# Patient Record
Sex: FEMALE | Race: OTHER | Employment: FULL TIME | URBAN - METROPOLITAN AREA
[De-identification: names, ages, dates, MRNs, and addresses within clinical notes are randomized per-mention and may not be internally consistent; named-entity substitution may affect disease eponyms.]

---

## 2018-11-24 ENCOUNTER — HOSPITAL ENCOUNTER (EMERGENCY)
Facility: HOSPITAL | Age: 38
Discharge: HOME | End: 2018-11-24
Attending: EMERGENCY MEDICINE

## 2018-11-24 VITALS
HEIGHT: 66 IN | TEMPERATURE: 97.9 F | BODY MASS INDEX: 20.89 KG/M2 | DIASTOLIC BLOOD PRESSURE: 59 MMHG | HEART RATE: 74 BPM | OXYGEN SATURATION: 100 % | RESPIRATION RATE: 18 BRPM | SYSTOLIC BLOOD PRESSURE: 127 MMHG | WEIGHT: 130 LBS

## 2018-11-24 DIAGNOSIS — M20.12 HALLUX VALGUS, ACQUIRED, BILATERAL: Primary | ICD-10-CM

## 2018-11-24 DIAGNOSIS — M20.10 HALLUX VALGUS WITH BUNIONS, UNSPECIFIED LATERALITY: ICD-10-CM

## 2018-11-24 DIAGNOSIS — M20.11 HALLUX VALGUS, ACQUIRED, BILATERAL: Primary | ICD-10-CM

## 2018-11-24 DIAGNOSIS — M21.619 HALLUX VALGUS WITH BUNIONS, UNSPECIFIED LATERALITY: ICD-10-CM

## 2018-11-24 PROCEDURE — 99283 EMERGENCY DEPT VISIT LOW MDM: CPT

## 2018-11-24 NOTE — DISCHARGE INSTRUCTIONS
Bunion   WHAT YOU NEED TO KNOW:   A bunion is a bony lump at the base of your big toe  As it grows, it sticks out from the side of your foot and may move your toe out of place  DISCHARGE INSTRUCTIONS:   Return to the emergency department if:   · You have severe pain in your toe  · You cannot put weight on your foot  Contact your healthcare provider if:   · You cannot do your daily activities because of the pain  · You have questions or concerns about your condition or care  Medicines: You may need any of the following:  · Acetaminophen  decreases pain  It is available without a doctor's order  Ask how much to take and how often to take it  Follow directions  Acetaminophen can cause liver damage if not taken correctly  · NSAIDs , such as ibuprofen, help decrease swelling, pain, and fever  This medicine is available with or without a doctor's order  NSAIDs can cause stomach bleeding or kidney problems in certain people  If you take blood thinner medicine, always ask your healthcare provider if NSAIDs are safe for you  Always read the medicine label and follow directions  · Take your medicine as directed  Contact your healthcare provider if you think your medicine is not helping or if you have side effects  Tell him of her if you are allergic to any medicine  Keep a list of the medicines, vitamins, and herbs you take  Include the amounts, and when and why you take them  Bring the list or the pill bottles to follow-up visits  Carry your medicine list with you in case of an emergency  Self-care:   · Use a bunion pad  Wear a thick, ring-shaped pad around and over the bunion to cushion it  · Wear shoes that fit well  Wear wide, low-heeled shoes that have plenty of room for your toes  Do not wear tight shoes or heels that are higher than 2 inches  · Wear shoe inserts or arch supports  These will decrease pressure on the bunion  · Separate your big toe at night    Separate the big toe from the others with a foam pad while you sleep  Use a light elastic bandage to keep the pad in place  · Stretch your foot each day  This will help decrease pressure and increase foot strength  Ask what foot exercises are best for you  · Apply ice  on your toe for 15 to 20 minutes every hour or as directed  Use an ice pack or put crushed ice in a plastic bag  Cover it with a towel  Ice helps prevent tissue damage and decreases swelling and pain  · Go to physical therapy if directed  A physical therapist teaches you exercises to help improve movement and strength, and to decrease pain  Follow up with your healthcare provider as directed: You may be referred to a podiatrist (foot specialist)  Write down your questions so you remember to ask them during your visits  © 2017 [facility] Information is for End User's use only and may not be sold, redistributed or otherwise used for commercial purposes  All illustrations and images included in CareNotes® are the copyrighted property of A D A M , Inc  or Nathan Cormier  The above information is an  only  It is not intended as medical advice for individual conditions or treatments  Talk to your doctor, nurse or pharmacist before following any medical regimen to see if it is safe and effective for you

## 2018-11-24 NOTE — ED PROVIDER NOTES
History  Chief Complaint   Patient presents with    Foot Pain     Pt has c/o L foot pain for 2 months     33 y/o female in nad, with subacute pain in both feet left > right, pt works for [employer], and on her feet  She states she changed her shoes to lessen the pain in her foot but lately increased pain mentioned  She denies fall or trauma  She has a prominent navicular bone anatomy with hallux valgus  Urged her for podiatry follow up  None       History reviewed  No pertinent past medical history  History reviewed  No pertinent surgical history  History reviewed  No pertinent family history  I have reviewed and agree with the history as documented  Social History   Substance Use Topics    Smoking status: Never Smoker    Smokeless tobacco: Never Used    Alcohol use No        Review of Systems   Musculoskeletal: Positive for arthralgias  All other systems reviewed and are negative  Physical Exam  Physical Exam   Constitutional: She appears well-developed and well-nourished  HENT:   Head: Normocephalic and atraumatic  Eyes: Pupils are equal, round, and reactive to light  Neck: Normal range of motion  Neck supple  Pulmonary/Chest: Effort normal    Abdominal: Soft  There is no guarding  Musculoskeletal: Normal range of motion  She exhibits tenderness  Left foot: There is tenderness, bony tenderness and swelling  There is normal range of motion, normal capillary refill, no crepitus, no deformity and no laceration  Feet:    Prominent navicular bone with bunion noted  Skin: Skin is warm  Psychiatric: She has a normal mood and affect  Thought content normal    Nursing note and vitals reviewed        Vital Signs  ED Triage Vitals [11/24/18 1157]   Temperature Pulse Respirations Blood Pressure SpO2   97 9 °F (36 6 °C) 74 18 127/59 100 %      Temp Source Heart Rate Source Patient Position - Orthostatic VS BP Location FiO2 (%)   Oral Monitor Sitting Left arm -- Pain Score       2           Vitals:    11/24/18 1157   BP: 127/59   Pulse: 74   Patient Position - Orthostatic VS: Sitting       Visual Acuity      ED Medications  Medications - No data to display    Diagnostic Studies  Results Reviewed     None                 No orders to display              Procedures  Procedures       Phone Contacts  ED Phone Contact    ED Course                               MDM  Number of Diagnoses or Management Options  Hallux valgus with bunions, unspecified laterality: new and does not require workup  Hallux valgus, acquired, bilateral: new and does not require workup  Diagnosis management comments: 35 y/o female in nad, acute on chronic b/l foot worse in left foot complaint  Pt with hallux valgus with prominent navicular bone in both feet noted  Pt agreed for podiatrist follow up  rteri given  Amount and/or Complexity of Data Reviewed  Tests in the radiology section of CPT®: ordered and reviewed      CritCare Time    Disposition  Final diagnoses:   Hallux valgus, acquired, bilateral   Hallux valgus with bunions, unspecified laterality     Time reflects when diagnosis was documented in both MDM as applicable and the Disposition within this note     Time User Action Codes Description Comment    11/24/2018 12:57 PM Ted Gross Add [M20 11,  M20 12] Hallux valgus, acquired, bilateral     11/24/2018 12:58 PM Ted Gross Add [M20 10,  M21 619] Hallux valgus with bunions, unspecified laterality       ED Disposition     ED Disposition Condition Comment    Discharge  Grant Kelly discharge to home/self care  Condition at discharge: Stable        Follow-up Information     Follow up With Specialties Details Why Contact Info    Marge Almaraz, [address] [place] [address]  [phone]      Angi Poon DPM Podiatry In 3 days podiatrist [address] [facility] [address]      MALINDA Otero [facility]  podiatrist [address] [facility] [address] [place] [address]  [phone]            Patient's Medications    No medications on file     No discharge procedures on file      ED Provider  Electronically Signed by           Shadia Lam PA-C  11/24/18 5763

## 2020-05-25 ENCOUNTER — HOSPITAL ENCOUNTER (EMERGENCY)
Facility: HOSPITAL | Age: 40
Discharge: HOME | End: 2020-05-25
Attending: EMERGENCY MEDICINE | Admitting: EMERGENCY MEDICINE
Payer: COMMERCIAL

## 2020-05-25 VITALS
BODY MASS INDEX: 22.5 KG/M2 | SYSTOLIC BLOOD PRESSURE: 127 MMHG | DIASTOLIC BLOOD PRESSURE: 74 MMHG | HEIGHT: 66 IN | RESPIRATION RATE: 20 BRPM | WEIGHT: 140 LBS | OXYGEN SATURATION: 100 % | HEART RATE: 75 BPM | TEMPERATURE: 98.3 F

## 2020-05-25 DIAGNOSIS — R42 LIGHTHEADEDNESS: ICD-10-CM

## 2020-05-25 DIAGNOSIS — U07.1 COVID-19: Primary | ICD-10-CM

## 2020-05-25 DIAGNOSIS — R53.1 GENERALIZED WEAKNESS: ICD-10-CM

## 2020-05-25 LAB
ANION GAP SERPL CALCULATED.3IONS-SCNC: 7 MMOL/L (ref 4–13)
ATRIAL RATE: 75 BPM
BUN SERPL-MCNC: 10 MG/DL (ref 5–25)
CALCIUM SERPL-MCNC: 8.4 MG/DL (ref 8.3–10.1)
CHLORIDE SERPL-SCNC: 104 MMOL/L (ref 100–108)
CO2 SERPL-SCNC: 27 MMOL/L (ref 21–32)
CREAT SERPL-MCNC: 0.56 MG/DL (ref 0.6–1.3)
EXT PREG TEST URINE: NEGATIVE
EXT. CONTROL ED NAV: NORMAL
FLUAV RNA NPH QL NAA+PROBE: NORMAL
FLUBV RNA NPH QL NAA+PROBE: NORMAL
GFR SERPL CREATININE-BSD FRML MDRD: 116 ML/MIN/1.73SQ M
GLUCOSE SERPL-MCNC: 101 MG/DL (ref 65–140)
P AXIS: 41 DEGREES
POTASSIUM SERPL-SCNC: 3.4 MMOL/L (ref 3.5–5.3)
PR INTERVAL: 138 MS
QRS AXIS: 260 DEGREES
QRSD INTERVAL: 94 MS
QT INTERVAL: 394 MS
QTC INTERVAL: 439 MS
RSV RNA NPH QL NAA+PROBE: NORMAL
SARS-COV-2 RNA RESP QL NAA+PROBE: POSITIVE
SODIUM SERPL-SCNC: 138 MMOL/L (ref 136–145)
T WAVE AXIS: 42 DEGREES
VENTRICULAR RATE: 75 BPM

## 2020-05-25 PROCEDURE — 99284 EMERGENCY DEPT VISIT MOD MDM: CPT | Performed by: EMERGENCY MEDICINE

## 2020-05-25 PROCEDURE — 96360 HYDRATION IV INFUSION INIT: CPT

## 2020-05-25 PROCEDURE — U0003 INFECTIOUS AGENT DETECTION BY NUCLEIC ACID (DNA OR RNA); SEVERE ACUTE RESPIRATORY SYNDROME CORONAVIRUS 2 (SARS-COV-2) (CORONAVIRUS DISEASE [COVID-19]), AMPLIFIED PROBE TECHNIQUE, MAKING USE OF HIGH THROUGHPUT TECHNOLOGIES AS DESCRIBED BY CMS-2020-01-R: HCPCS | Performed by: EMERGENCY MEDICINE

## 2020-05-25 PROCEDURE — 81025 URINE PREGNANCY TEST: CPT | Performed by: EMERGENCY MEDICINE

## 2020-05-25 PROCEDURE — 36415 COLL VENOUS BLD VENIPUNCTURE: CPT | Performed by: EMERGENCY MEDICINE

## 2020-05-25 PROCEDURE — 99285 EMERGENCY DEPT VISIT HI MDM: CPT

## 2020-05-25 PROCEDURE — 93010 ELECTROCARDIOGRAM REPORT: CPT | Performed by: INTERNAL MEDICINE

## 2020-05-25 PROCEDURE — 80048 BASIC METABOLIC PNL TOTAL CA: CPT | Performed by: EMERGENCY MEDICINE

## 2020-05-25 PROCEDURE — 93005 ELECTROCARDIOGRAM TRACING: CPT

## 2020-05-25 PROCEDURE — 87631 RESP VIRUS 3-5 TARGETS: CPT | Performed by: EMERGENCY MEDICINE

## 2020-05-25 RX ORDER — KETAMINE HCL IN NACL, ISO-OSM 100MG/10ML
2 SYRINGE (ML) INJECTION ONCE
Status: DISCONTINUED | OUTPATIENT
Start: 2020-05-25 | End: 2020-05-25

## 2020-05-25 RX ORDER — OSELTAMIVIR PHOSPHATE 30 MG/1
30 CAPSULE ORAL EVERY 12 HOURS SCHEDULED
COMMUNITY

## 2020-05-25 RX ORDER — LIDOCAINE HYDROCHLORIDE AND EPINEPHRINE 10; 10 MG/ML; UG/ML
5 INJECTION, SOLUTION INFILTRATION; PERINEURAL ONCE
Status: DISCONTINUED | OUTPATIENT
Start: 2020-05-25 | End: 2020-05-25

## 2020-05-25 RX ADMIN — SODIUM CHLORIDE 1000 ML: 0.9 INJECTION, SOLUTION INTRAVENOUS at 15:22

## 2024-06-02 ENCOUNTER — HOSPITAL ENCOUNTER (EMERGENCY)
Facility: HOSPITAL | Age: 44
Discharge: HOME | End: 2024-06-02
Attending: EMERGENCY MEDICINE | Admitting: EMERGENCY MEDICINE
Payer: COMMERCIAL

## 2024-06-02 VITALS
HEART RATE: 86 BPM | OXYGEN SATURATION: 100 % | SYSTOLIC BLOOD PRESSURE: 113 MMHG | DIASTOLIC BLOOD PRESSURE: 56 MMHG | RESPIRATION RATE: 16 BRPM | TEMPERATURE: 97.9 F

## 2024-06-02 DIAGNOSIS — M54.50 ACUTE RIGHT-SIDED LOW BACK PAIN WITHOUT SCIATICA: Primary | ICD-10-CM

## 2024-06-02 LAB
BACTERIA UR QL AUTO: NORMAL /HPF
BILIRUB UR QL STRIP: NEGATIVE
CLARITY UR: CLEAR
COLOR UR: YELLOW
EXT PREGNANCY TEST URINE: NEGATIVE
EXT. CONTROL: NORMAL
GLUCOSE UR STRIP-MCNC: NEGATIVE MG/DL
HGB UR QL STRIP.AUTO: ABNORMAL
KETONES UR STRIP-MCNC: NEGATIVE MG/DL
LEUKOCYTE ESTERASE UR QL STRIP: ABNORMAL
NITRITE UR QL STRIP: NEGATIVE
NON-SQ EPI CELLS URNS QL MICRO: NORMAL /HPF
PH UR STRIP.AUTO: 5.5 [PH] (ref 4.5–8)
PROT UR STRIP-MCNC: NEGATIVE MG/DL
RBC #/AREA URNS AUTO: NORMAL /HPF
SP GR UR STRIP.AUTO: 1.02 (ref 1–1.03)
UROBILINOGEN UR QL STRIP.AUTO: 0.2 E.U./DL
WBC #/AREA URNS AUTO: NORMAL /HPF

## 2024-06-02 PROCEDURE — 99284 EMERGENCY DEPT VISIT MOD MDM: CPT

## 2024-06-02 PROCEDURE — 81025 URINE PREGNANCY TEST: CPT

## 2024-06-02 PROCEDURE — 99284 EMERGENCY DEPT VISIT MOD MDM: CPT | Performed by: EMERGENCY MEDICINE

## 2024-06-02 PROCEDURE — 81001 URINALYSIS AUTO W/SCOPE: CPT

## 2024-06-02 PROCEDURE — 96374 THER/PROPH/DIAG INJ IV PUSH: CPT

## 2024-06-02 RX ORDER — METHOCARBAMOL 500 MG/1
500 TABLET, FILM COATED ORAL 2 TIMES DAILY
Qty: 20 TABLET | Refills: 0 | Status: SHIPPED | OUTPATIENT
Start: 2024-06-02

## 2024-06-02 RX ORDER — KETOROLAC TROMETHAMINE 30 MG/ML
15 INJECTION, SOLUTION INTRAMUSCULAR; INTRAVENOUS ONCE
Status: COMPLETED | OUTPATIENT
Start: 2024-06-02 | End: 2024-06-02

## 2024-06-02 RX ORDER — LIDOCAINE 50 MG/G
1 PATCH TOPICAL ONCE
Status: DISCONTINUED | OUTPATIENT
Start: 2024-06-02 | End: 2024-06-02 | Stop reason: HOSPADM

## 2024-06-02 RX ORDER — ACETAMINOPHEN 325 MG/1
650 TABLET ORAL ONCE
Status: COMPLETED | OUTPATIENT
Start: 2024-06-02 | End: 2024-06-02

## 2024-06-02 RX ORDER — LIDOCAINE 50 MG/G
1 PATCH TOPICAL DAILY
Qty: 14 PATCH | Refills: 0 | Status: SHIPPED | OUTPATIENT
Start: 2024-06-02

## 2024-06-02 RX ADMIN — KETOROLAC TROMETHAMINE 15 MG: 30 INJECTION, SOLUTION INTRAMUSCULAR; INTRAVENOUS at 09:23

## 2024-06-02 RX ADMIN — ACETAMINOPHEN 650 MG: 325 TABLET, FILM COATED ORAL at 09:01

## 2024-06-02 RX ADMIN — LIDOCAINE 5% 1 PATCH: 700 PATCH TOPICAL at 09:02

## 2024-06-02 NOTE — ED PROVIDER NOTES
"History  Chief Complaint   Patient presents with    Flank Pain     Right side for the past 3-4 days when moving pain becomes \"sharp\".  Pt denies any urgency or burning with urination.      43-year-old female with no significant past medical history who presents for evaluation of right-sided lower back pain for the past 3 to 4 days.  The patient states that she is experiencing sharp pain in her right lower back with movement.  The patient reports minimal pain at rest.  The patient denies radiation of her pain.  Patient denies any falls or trauma.  The the patient took 1 dose of 200 mg of ibuprofen without improvement.  The patient has not taken any other medications for symptoms.  The patient denies fever, chills, nausea, vomiting, abdominal pain, diarrhea, dysuria, hematuria, vaginal bleeding, vaginal discharge, numbness or weakness in her lower extremities, saddle anesthesia, incontinence, urinary retention, and history of IV drug abuse.        Prior to Admission Medications   Prescriptions Last Dose Informant Patient Reported? Taking?   oseltamivir (TAMIFLU) 30 MG capsule   Yes No   Sig: Take 30 mg by mouth every 12 (twelve) hours      Facility-Administered Medications: None       No past medical history on file.    Past Surgical History:   Procedure Laterality Date    US GUIDED THYROID BIOPSY  12/9/2020       No family history on file.  I have reviewed and agree with the history as documented.    E-Cigarette/Vaping    E-Cigarette Use Never User      E-Cigarette/Vaping Substances    Nicotine No     THC No     CBD No     Flavoring No     Other No     Unknown No      Social History     Tobacco Use    Smoking status: Never    Smokeless tobacco: Never   Vaping Use    Vaping status: Never Used   Substance Use Topics    Alcohol use: No    Drug use: No        Review of Systems   Constitutional:  Negative for chills, diaphoresis and fever.   HENT:  Negative for congestion and sore throat.    Eyes:  Negative for pain and " redness.   Respiratory:  Negative for cough and shortness of breath.    Cardiovascular:  Negative for chest pain, palpitations and leg swelling.   Gastrointestinal:  Negative for abdominal pain, diarrhea, nausea and vomiting.   Genitourinary:  Negative for dysuria, flank pain and hematuria.   Musculoskeletal:  Positive for back pain. Negative for neck pain.   Skin:  Negative for color change, pallor and rash.   Neurological:  Negative for dizziness, syncope, weakness, light-headedness, numbness and headaches.   All other systems reviewed and are negative.      Physical Exam  ED Triage Vitals [06/02/24 0750]   Temperature Pulse Respirations Blood Pressure SpO2   97.9 °F (36.6 °C) 86 16 113/56 100 %      Temp Source Heart Rate Source Patient Position - Orthostatic VS BP Location FiO2 (%)   Tympanic Monitor Sitting Left arm --      Pain Score       8             Orthostatic Vital Signs  Vitals:    06/02/24 0750   BP: 113/56   Pulse: 86   Patient Position - Orthostatic VS: Sitting       Physical Exam  Vitals and nursing note reviewed.   Constitutional:       General: She is not in acute distress.     Appearance: Normal appearance. She is not ill-appearing, toxic-appearing or diaphoretic.   HENT:      Head: Normocephalic and atraumatic.      Nose: Nose normal. No congestion or rhinorrhea.      Mouth/Throat:      Mouth: Mucous membranes are moist.      Pharynx: Oropharynx is clear.   Eyes:      General: No scleral icterus.     Extraocular Movements: Extraocular movements intact.      Conjunctiva/sclera: Conjunctivae normal.      Pupils: Pupils are equal, round, and reactive to light.   Cardiovascular:      Rate and Rhythm: Normal rate and regular rhythm.      Pulses: Normal pulses.      Heart sounds: Normal heart sounds. No murmur heard.     No friction rub. No gallop.   Pulmonary:      Effort: Pulmonary effort is normal.      Breath sounds: Normal breath sounds. No wheezing, rhonchi or rales.   Abdominal:      General:  Abdomen is flat.      Palpations: Abdomen is soft.      Tenderness: There is no abdominal tenderness. There is no right CVA tenderness, left CVA tenderness, guarding or rebound.   Musculoskeletal:         General: Tenderness present. No swelling, deformity or signs of injury. Normal range of motion.      Cervical back: Normal range of motion and neck supple. No rigidity or tenderness.      Right lower leg: No edema.      Left lower leg: No edema.      Comments: Mild tenderness of right lumbar region paraspinal muscles.  No midline spinal tenderness.   Lymphadenopathy:      Cervical: No cervical adenopathy.   Skin:     General: Skin is warm and dry.      Capillary Refill: Capillary refill takes less than 2 seconds.      Coloration: Skin is not jaundiced or pale.      Findings: No bruising, erythema, lesion or rash.   Neurological:      General: No focal deficit present.      Mental Status: She is alert and oriented to person, place, and time.      Sensory: No sensory deficit.      Motor: No weakness.      Gait: Gait normal.      Deep Tendon Reflexes: Reflexes normal.         ED Medications  Medications   ketorolac (TORADOL) injection 15 mg (15 mg Intravenous Given 6/2/24 0923)   acetaminophen (TYLENOL) tablet 650 mg (650 mg Oral Given 6/2/24 0901)       Diagnostic Studies  Results Reviewed       Procedure Component Value Units Date/Time    Urine Microscopic [[id number]]  (Normal) Collected: 06/02/24 0915    Lab Status: Final result Specimen: Urine, Clean Catch Updated: 06/02/24 0951     RBC, UA 1-2 /hpf      WBC, UA 1-2 /hpf      Epithelial Cells Occasional /hpf      Bacteria, UA None Seen /hpf     POCT pregnancy, urine [[id number]]  (Normal) Resulted: 06/02/24 0924    Lab Status: Final result Updated: 06/02/24 0924     EXT Preg Test, Ur Negative     Control Valid    Urine Macroscopic, POC [[id number]]  (Abnormal) Collected: 06/02/24 0915    Lab Status: Final result Specimen: Urine Updated: 06/02/24 0917     Color, UA  Yellow     Clarity, UA Clear     pH, UA 5.5     Leukocytes, UA Trace     Nitrite, UA Negative     Protein, UA Negative mg/dl      Glucose, UA Negative mg/dl      Ketones, UA Negative mg/dl      Urobilinogen, UA 0.2 E.U./dl      Bilirubin, UA Negative     Occult Blood, UA Trace     Specific Gravity, UA 1.025    Narrative:      CLINITEK RESULT                   No orders to display         Procedures  Procedures      ED Course                             SBIRT 22yo+      Flowsheet Row Most Recent Value   Initial Alcohol Screen: US AUDIT-C     1. How often do you have a drink containing alcohol? 2 Filed at: 06/02/2024 0752   2. How many drinks containing alcohol do you have on a typical day you are drinking?  0 Filed at: 06/02/2024 0752   3a. Male UNDER 65: How often do you have five or more drinks on one occasion? 0 Filed at: 06/02/2024 0752   3b. FEMALE Any Age, or MALE 65+: How often do you have 4 or more drinks on one occassion? 0 Filed at: 06/02/2024 0752   Audit-C Score 2 Filed at: 06/02/2024 0752   EDITH: How many times in the past year have you...    Used an illegal drug or used a prescription medication for non-medical reasons? Never Filed at: 06/02/2024 0752                  Medical Decision Making  43-year-old female with no significant past medical history who presents for evaluation of right-sided lower back pain for the past 3 to 4 days.  Vitals are within the normal limits.  Exam is significant for right lumbar region paraspinal tenderness but is otherwise unremarkable.  The patient has no red flag symptoms.  Pyelonephritis and nephrolithiasis are unlikely as the patient's pain only occurs with movement.  Will check UA and urine pregnancy.  Will treat symptomatically with Tylenol, Toradol, and lidocaine patch.    The patient reports improvement in her symptoms after treatment.  The patient is instructed to continue taking ibuprofen for her pain.  A prescription for Robaxin and lidocaine patches is sent to  the patient's pharmacy.  A referral to the comprehensive spine program is ordered for follow-up.  Return precautions are given and the patient is discharged.    Amount and/or Complexity of Data Reviewed  Labs: ordered.    Risk  OTC drugs.  Prescription drug management.          Disposition  Final diagnoses:   Acute right-sided low back pain without sciatica     Time reflects when diagnosis was documented in both MDM as applicable and the Disposition within this note       Time User Action Codes Description Comment    6/2/2024 10:22 AM Juan Bass Add [M54.50] Acute right-sided low back pain without sciatica           ED Disposition       ED Disposition   Discharge    Condition   Stable    Date/Time   Sun Jun 2, 2024 1022    Comment   Deepthi Leyva discharge to home/self care.                   Follow-up Information       Follow up With Specialties Details Why Contact Info Additional Information    [facility] Emergency Department Emergency Medicine Go to  If symptoms worsen [address] [place] [id number] [phone] [facility] Emergency Department, [address] [place] [address], [id number]   [phone]    [facility] Comprehensive Spine Program Physical Therapy   [phone]            Discharge Medication List as of 6/2/2024 10:24 AM        START taking these medications    Details   lidocaine (Lidoderm) 5 % Apply 1 patch topically over 12 hours daily Remove & Discard patch within 12 hours or as directed by MD, Starting Sun 6/2/2024, Normal      methocarbamol (ROBAXIN) 500 mg tablet Take 1 tablet (500 mg total) by mouth 2 (two) times a day, Starting Sun 6/2/2024, Normal           CONTINUE these medications which have NOT CHANGED    Details   oseltamivir (TAMIFLU) 30 MG capsule Take 30 mg by mouth every 12 (twelve) hours, Historical Med               PDMP Review       None             ED Provider  Attending physically available  and evaluated Zandavid Leyva. I managed the patient along with the ED Attending.    Electronically Signed by           Juan Bass DO  06/02/24 0363

## 2024-06-02 NOTE — DISCHARGE INSTRUCTIONS
You were seen in the emergency department for right-sided lower back pain.  Your symptoms and exam are consistent with musculoskeletal back pain.  Take over-the-counter medications such as ibuprofen and Tylenol for your pain.  Additionally a prescription for a muscle relaxer and lidocaine patches was sent to your pharmacy.  A referral to the comprehensive spine program was ordered for follow-up.  They will call you to schedule an appointment.  If you have worsening pain or develop any new concerning symptoms please return to the emergency department.

## 2024-06-03 ENCOUNTER — TELEPHONE (OUTPATIENT)
Dept: PHYSICAL THERAPY | Facility: OTHER | Age: 44
End: 2024-06-03

## 2024-06-05 ENCOUNTER — HOSPITAL ENCOUNTER (EMERGENCY)
Facility: HOSPITAL | Age: 44
Discharge: HOME | End: 2024-06-05
Attending: EMERGENCY MEDICINE
Payer: COMMERCIAL

## 2024-06-05 VITALS
RESPIRATION RATE: 18 BRPM | HEART RATE: 83 BPM | SYSTOLIC BLOOD PRESSURE: 146 MMHG | OXYGEN SATURATION: 98 % | TEMPERATURE: 98.9 F | DIASTOLIC BLOOD PRESSURE: 77 MMHG

## 2024-06-05 DIAGNOSIS — M54.50 LOW BACK PAIN: Primary | ICD-10-CM

## 2024-06-05 PROCEDURE — 99283 EMERGENCY DEPT VISIT LOW MDM: CPT | Performed by: EMERGENCY MEDICINE

## 2024-06-05 PROCEDURE — 99281 EMR DPT VST MAYX REQ PHY/QHP: CPT

## 2024-06-05 NOTE — Clinical Note
Deepthi Leyva was seen and treated in our emergency department on 6/5/2024.            light duty heavy lifting    Diagnosis:     Deepthi  .    She may return on this date: 06/12/2024         If you have any questions or concerns, please don't hesitate to call.      Devon Bautista MD    ______________________________           _______________          _______________  Hospital Representative                              Date                                Time

## 2024-06-05 NOTE — Clinical Note
Deepthi Leyva was seen and treated in our emergency department on 6/5/2024.            light duty heavy lifting    Diagnosis:     Deepthi  .    She may return on this date: 06/12/2024         If you have any questions or concerns, please don't hesitate to call.      Devon Bautista MD    ______________________________           _______________          _______________  Hospital Representative                              Date                                Time Patient called phone service 12/05/2022 at 4:03pm stating she would like to schedule an appointment. And asked DO you accept walk-in?

## 2024-06-05 NOTE — ED ATTENDING ATTESTATION
6/2/2024  I, Margot Sargent MD, saw and evaluated the patient. I have discussed the patient with the resident/non-physician practitioner and agree with the resident's/non-physician practitioner's findings, Plan of Care, and MDM as documented in the resident's/non-physician practitioner's note, except where noted. All available labs and Radiology studies were reviewed.  I was present for key portions of any procedure(s) performed by the resident/non-physician practitioner and I was immediately available to provide assistance.       At this point I agree with the current assessment done in the Emergency Department.  I have conducted an independent evaluation of this patient a history and physical is as follows:  43-year-old female with right lower back pain.  No numbness, tingling, weakness.  Pain is sharp.  Has been going on for 3 to 4 days.  Worse with movement or twisting, minimal at rest.  No urinary symptoms or red flag symptoms.  On exam patient is awake, alert, benign abdominal exam, no midline tenderness, no neurological deficit, no saddle anesthesia, intact pulses.  Impression: Musculoskeletal back pain.  Will plan to treat symptomatically  ED Course         Critical Care Time  Procedures

## 2024-06-06 NOTE — ED PROVIDER NOTES
Chief Complaint   Patient presents with    Letter for School/Work     Pt was here last Sunday for lower back pain, needs work excuse      History of Present Illness and Review of Systems   This is a 43 y.o. female with recent admission to the hospital for lower back pain this previous Sunday.  The patient states that she was worked up and diagnosed with likely musculoskeletal back pain.  The patient was given supportive care and was discharged at that time.  The patient returned back to work where she was expected to return to full duty.  The patient states that her back pain is improving however she is still not comfortable with doing heavy lifting that is required for her job.  She states that she is capable of doing light duty and other positions of the job description.  She requests a work note.  The patient is planning to follow-up with physical therapy at this time.  No other complaints for this encounter.    Remainder of ROS Reviewed and Non-Pertinent    Past Medical, Past Surgical History:    has no past medical history on file.   has a past surgical history that includes US guided thyroid biopsy (12/9/2020).     Allergies:   No Known Allergies    Social and Family History:     Social History     Substance and Sexual Activity   Alcohol Use No     Social History     Tobacco Use   Smoking Status Never   Smokeless Tobacco Never     Social History     Substance and Sexual Activity   Drug Use No       Physical Examination     Vitals:    06/05/24 2230   BP: 146/77   BP Location: Left arm   Pulse: 83   Resp: 18   Temp: 98.9 °F (37.2 °C)   TempSrc: Temporal   SpO2: 98%       Physical Exam  Vitals and nursing note reviewed.   Constitutional:       General: She is not in acute distress.     Appearance: She is well-developed.   HENT:      Head: Normocephalic and atraumatic.   Eyes:      Conjunctiva/sclera: Conjunctivae normal.   Cardiovascular:      Rate and Rhythm: Normal rate and regular rhythm.      Heart sounds: No  murmur heard.  Pulmonary:      Effort: Pulmonary effort is normal. No respiratory distress.      Breath sounds: Normal breath sounds.   Abdominal:      Palpations: Abdomen is soft.      Tenderness: There is no abdominal tenderness.   Musculoskeletal:         General: Tenderness present. No swelling.      Cervical back: Neck supple.   Skin:     General: Skin is warm and dry.      Capillary Refill: Capillary refill takes less than 2 seconds.   Neurological:      Mental Status: She is alert and oriented to person, place, and time.      Cranial Nerves: No cranial nerve deficit.      Sensory: No sensory deficit.      Motor: No weakness.      Gait: Gait normal.      Deep Tendon Reflexes: Reflexes normal.   Psychiatric:         Mood and Affect: Mood normal.           Procedures   Procedures      MDM:   Medical Decision Making  43-year-old woman with improving lower back pain.  The patient does not have any signs neuropathic abnormalities including urinary retention saddle anesthesia fever chills point tenderness over the spinous processes etc.  The patient's back pain is lateral to the spinous processes and is worse with spine extension and better with flexion.  The patient has been doing stretches and that has improved her symptoms.  Plan to oblige to the patient's request for a Light duty work note so that the patient can continue to improve and heal while she gets physical therapy.    - Reviewed relevant past office visits/hospitalizations/procedures  -Obtained pertinent history that influenced decision making from the         Final Dispo   Final Diagnosis:  1. Low back pain      Time reflects when diagnosis was documented in both MDM as applicable and the Disposition within this note       Time User Action Codes Description Comment    6/5/2024 10:47 PM Devon Bautista Add [M54.50] Low back pain           ED Disposition       ED Disposition   Discharge    Condition   Stable    Date/Time   Wed Jun 5, 2024 10:48 PM     "Comment   Deepthi Leyva discharge to home/self care.                   Follow-up Information       Follow up With Specialties Details Why Contact Info    Erlin Sorto MD Family Medicine   [address] [place] [address]  [phone]            Medications - No data to display    Risk Stratification Tools                No orders of the defined types were placed in this encounter.      Labs:   Labs Reviewed - No data to display    Imaging:     No orders to display      All details of the evaluation and treatment plan were made clear and additionally all questions and concerns were addressed while under my care.    Portions of the record may have been created with voice recognition software. Occasional wrong word or \"sound a like\" substitutions may have occurred due to the inherent limitations of voice recognition software. Read the chart carefully and recognize, using context, where substitutions have occurred.    The attending physician physically available and evaluated the above patient alongside myself.      Devon Bautista MD  06/11/24 1130    "

## 2024-06-07 NOTE — ED ATTENDING ATTESTATION
6/5/2024  I, Anibal Gallardo DO, saw and evaluated the patient. I have discussed the patient with the resident/non-physician practitioner and agree with the resident's/non-physician practitioner's findings, Plan of Care, and MDM as documented in the resident's/non-physician practitioner's note, except where noted. All available labs and Radiology studies were reviewed.  I was present for key portions of any procedure(s) performed by the resident/non-physician practitioner and I was immediately available to provide assistance.       At this point I agree with the current assessment done in the Emergency Department.  I have conducted an independent evaluation of this patient a history and physical is as follows: Patient was seen here previously requesting school and work note, will perform work evaluation and noted this point in time, plan for be discharged.    ED Course         Critical Care Time  Procedures

## 2024-06-13 NOTE — TELEPHONE ENCOUNTER
Call placed to the patient per [facility] referral.    Voice message left for patient to call back. Phone number and hours of business provided.     Per comp [facility] protocol will close referral and assist when call back is received